# Patient Record
Sex: FEMALE | Race: WHITE | NOT HISPANIC OR LATINO
[De-identification: names, ages, dates, MRNs, and addresses within clinical notes are randomized per-mention and may not be internally consistent; named-entity substitution may affect disease eponyms.]

---

## 2020-12-09 PROBLEM — Z00.00 ENCOUNTER FOR PREVENTIVE HEALTH EXAMINATION: Status: ACTIVE | Noted: 2020-12-09

## 2020-12-23 ENCOUNTER — APPOINTMENT (OUTPATIENT)
Dept: PULMONOLOGY | Facility: CLINIC | Age: 65
End: 2020-12-23
Payer: COMMERCIAL

## 2020-12-23 VITALS — WEIGHT: 155 LBS | HEIGHT: 61 IN | BODY MASS INDEX: 29.27 KG/M2

## 2020-12-23 PROCEDURE — 99213 OFFICE O/P EST LOW 20 MIN: CPT | Mod: 95

## 2020-12-23 RX ORDER — ESCITALOPRAM OXALATE 20 MG/1
20 TABLET, FILM COATED ORAL
Refills: 0 | Status: ACTIVE | COMMUNITY

## 2020-12-23 RX ORDER — RABEPRAZOLE SODIUM 20 MG/1
TABLET, DELAYED RELEASE ORAL
Refills: 0 | Status: ACTIVE | COMMUNITY

## 2020-12-23 RX ORDER — OLMESARTAN MEDOXOMIL 40 MG/1
TABLET, FILM COATED ORAL
Refills: 0 | Status: ACTIVE | COMMUNITY

## 2020-12-23 NOTE — HISTORY OF PRESENT ILLNESS
[FreeTextEntry1] : Dr. Durbin\par 65 year old woman  with history of sleep apnea 15 yrs ago and lost weight and stopped using the cpap is here in the sleep center to address excessive snoring and wtinessed apneas.  Patient is sleepy with Bison sleepiness score of 12 .  Patient has loud snoring , also has witnessed apneas. She has sleep problems for long time has problems falling and staying sleep. Patient's bedtime is around 10-11 PM wakes up in the morning around 8-9 AM. Total sleep time usually is 5 hrs due to disturbed sleep.  She feels tired when she wakes up.  Patient does not drink coffee during the daytime, patient does not have any headaches , has nocturia 2-3 times at night.  She is sleepy while driving.\par STOPBANG score - 4 (moderate risk for apnea)\par

## 2021-01-10 ENCOUNTER — TRANSCRIPTION ENCOUNTER (OUTPATIENT)
Age: 66
End: 2021-01-10

## 2021-04-26 ENCOUNTER — APPOINTMENT (OUTPATIENT)
Dept: PULMONOLOGY | Facility: HOSPITAL | Age: 66
End: 2021-04-26
Payer: COMMERCIAL

## 2021-04-26 VITALS
HEIGHT: 61 IN | WEIGHT: 160 LBS | DIASTOLIC BLOOD PRESSURE: 70 MMHG | HEART RATE: 74 BPM | BODY MASS INDEX: 30.21 KG/M2 | OXYGEN SATURATION: 97 % | SYSTOLIC BLOOD PRESSURE: 120 MMHG

## 2021-04-26 PROCEDURE — 99213 OFFICE O/P EST LOW 20 MIN: CPT

## 2021-04-26 RX ORDER — IPRATROPIUM BROMIDE 42 UG/1
0.06 SPRAY NASAL
Qty: 45 | Refills: 0 | Status: ACTIVE | COMMUNITY
Start: 2021-03-15

## 2021-04-26 RX ORDER — FLUTICASONE PROPIONATE 50 UG/1
50 SPRAY, METERED NASAL
Qty: 48 | Refills: 0 | Status: ACTIVE | COMMUNITY
Start: 2021-03-08

## 2021-04-26 RX ORDER — VALACYCLOVIR 1 G/1
1 TABLET, FILM COATED ORAL
Qty: 12 | Refills: 0 | Status: ACTIVE | COMMUNITY
Start: 2021-02-22

## 2021-04-26 NOTE — PHYSICAL EXAM
[General Appearance - Well Developed] : well developed [General Appearance - Well Nourished] : well nourished [Heart Sounds] : normal S1 and S2 [Murmurs] : no murmurs [] : no respiratory distress [Auscultation Breath Sounds / Voice Sounds] : lungs were clear to auscultation bilaterally [No Focal Deficits] : no focal deficits [Oriented To Time, Place, And Person] : oriented to person, place, and time [Memory Recent] : recent memory was not impaired [FreeTextEntry1] : malampati 4 [FreeTextEntry2] : no edema

## 2021-04-26 NOTE — HISTORY OF PRESENT ILLNESS
[FreeTextEntry1] : Dr. Durbin\par 65 year old woman got her vaccines for covid  with history of sleep apnea 15 yrs ago and lost weight and stopped using the cpap is here in the sleep center to address sleep apnea.  Patient is not sleepy with cpap .  Patients snoring and witnessed apneas improved. Patients insomnia improved with cpap. Patient's bedtime is around 10-11 PM wakes up in the morning around 8-9 AM. \par \par download data shows ahi 3.1\par pressure 11.5 cm\par uses nasal pillows\par dme apria\par on airsense device.\par

## 2021-04-26 NOTE — ASSESSMENT
[FreeTextEntry1] : 65 year  old woman  with sleep apnea is doing well with the CPAP.  Patient is compliant with the CPAP and benefited significantly with the CPAP.\par

## 2021-05-12 ENCOUNTER — NON-APPOINTMENT (OUTPATIENT)
Age: 66
End: 2021-05-12

## 2021-05-21 ENCOUNTER — APPOINTMENT (OUTPATIENT)
Dept: BREAST CENTER | Facility: CLINIC | Age: 66
End: 2021-05-21

## 2021-05-21 DIAGNOSIS — Z86.39 PERSONAL HISTORY OF OTHER ENDOCRINE, NUTRITIONAL AND METABOLIC DISEASE: ICD-10-CM

## 2021-05-21 DIAGNOSIS — M19.90 UNSPECIFIED OSTEOARTHRITIS, UNSPECIFIED SITE: ICD-10-CM

## 2021-05-21 DIAGNOSIS — Z80.1 FAMILY HISTORY OF MALIGNANT NEOPLASM OF TRACHEA, BRONCHUS AND LUNG: ICD-10-CM

## 2021-05-21 DIAGNOSIS — Z80.3 FAMILY HISTORY OF MALIGNANT NEOPLASM OF BREAST: ICD-10-CM

## 2021-05-21 DIAGNOSIS — Z80.7 FAMILY HISTORY OF OTHER MALIGNANT NEOPLASMS OF LYMPHOID, HEMATOPOIETIC AND RELATED TISSUES: ICD-10-CM

## 2021-05-21 DIAGNOSIS — Z87.898 PERSONAL HISTORY OF OTHER SPECIFIED CONDITIONS: ICD-10-CM

## 2021-05-21 DIAGNOSIS — Z86.59 PERSONAL HISTORY OF OTHER MENTAL AND BEHAVIORAL DISORDERS: ICD-10-CM

## 2021-05-21 DIAGNOSIS — Z86.79 PERSONAL HISTORY OF OTHER DISEASES OF THE CIRCULATORY SYSTEM: ICD-10-CM

## 2021-05-24 ENCOUNTER — APPOINTMENT (OUTPATIENT)
Dept: BREAST CENTER | Facility: CLINIC | Age: 66
End: 2021-05-24
Payer: COMMERCIAL

## 2021-05-24 VITALS
HEART RATE: 67 BPM | SYSTOLIC BLOOD PRESSURE: 116 MMHG | OXYGEN SATURATION: 98 % | DIASTOLIC BLOOD PRESSURE: 75 MMHG | HEIGHT: 61 IN | BODY MASS INDEX: 30.21 KG/M2 | WEIGHT: 160 LBS

## 2021-05-24 PROCEDURE — 99072 ADDL SUPL MATRL&STAF TM PHE: CPT

## 2021-05-24 PROCEDURE — 99213 OFFICE O/P EST LOW 20 MIN: CPT

## 2021-05-24 NOTE — REVIEW OF SYSTEMS
[Diarrhea] : diarrhea [Joint Pain] : joint pain [Negative] : Heme/Lymph [FreeTextEntry9] : NECK AND BACK PAIN

## 2021-05-24 NOTE — PHYSICAL EXAM
[Normocephalic] : normocephalic [Atraumatic] : atraumatic [EOMI] : extra ocular movement intact [Supple] : supple [No Supraclavicular Adenopathy] : no supraclavicular adenopathy [No Cervical Adenopathy] : no cervical adenopathy [Examined in the supine and seated position] : examined in the supine and seated position [No dominant masses] : no dominant masses in right breast  [No dominant masses] : no dominant masses left breast [No Nipple Retraction] : no left nipple retraction [No Nipple Discharge] : no left nipple discharge [Breast Mass Right Breast ___cm] : no masses [Breast Mass Left Breast ___cm] : no masses [Breast Nipple Inversion] : nipples not inverted [Breast Nipple Retraction] : nipples not retracted [Breast Nipple Flattening] : nipples not flattened [Breast Nipple Fissures] : nipples not fissured [Breast Abnormal Lactation (Galactorrhea)] : no galactorrhea [Breast Abnormal Secretion Bloody Fluid] : no bloody discharge [Breast Abnormal Secretion Serous Fluid] : no serous discharge [Breast Abnormal Secretion Opalescent Fluid] : no milky discharge [No Axillary Lymphadenopathy] : no left axillary lymphadenopathy [No Edema] : no edema [No Rashes] : no rashes [No Ulceration] : no ulceration [de-identified] : On exam, the patient has full C-cup breasts.  On palpation, she has typical fibroglandular changes but I cannot feel any suspicious densities.  She has no axillary, supraclavicular, or cervical adenopathy.

## 2021-05-24 NOTE — ASSESSMENT
[FreeTextEntry1] : The patient is a 65-year-old G3, P2 postmenopausal white female of [place] descent.  She underwent menarche at age 14 and had her first child at age 30.  She underwent menopause at age 45 and never took any hormone replacement therapy.  She has a strong family history of breast cancer with her mother had breast cancer at age 72 and passed away.  Her sister had breast cancer at age 47.  She has a paternal cousin who had lymphoma at age 25.  The patient's sister tested BRCA negative and had an adenocystic breast cancer.  The patient has a history of fibrocystic mastopathy and has a Wendie model lifetime risk of breast cancer of 38.9%.  She did undergo a left breast biopsy in the 12:00 region in 2007 for a fibroadenoma and had a right breast lymph node biopsy in 2010 which was benign.  On exam, I cannot feel any suspicious densities in either breast today.  She underwent her last bilateral mammography and ultrasound on December 15, 2020 which showed some stable bilateral nodules.  Her last MRI in June 2020 showed no suspicious findings.  She will be due for her next MRI in June 2021 and will obtain her next bilateral mammo and ultrasound in October 2021.  I can see her again in 1 year.

## 2021-05-24 NOTE — REASON FOR VISIT
[Follow-Up: _____] : a [unfilled] follow-up visit [FreeTextEntry1] : The patient comes in with a strong family history of breast cancer and personal history of breast biopsies and fibrocystic mastopathy for continud close surveillance

## 2021-06-23 ENCOUNTER — NON-APPOINTMENT (OUTPATIENT)
Age: 66
End: 2021-06-23

## 2021-08-04 ENCOUNTER — APPOINTMENT (OUTPATIENT)
Dept: PULMONOLOGY | Facility: CLINIC | Age: 66
End: 2021-08-04
Payer: COMMERCIAL

## 2021-08-04 PROCEDURE — 99212 OFFICE O/P EST SF 10 MIN: CPT | Mod: 95

## 2021-08-04 NOTE — HISTORY OF PRESENT ILLNESS
[FreeTextEntry1] : Dr. Durbin\par 65 year old woman got her vaccines for covid  with history of sleep apnea 15 yrs ago and lost weight and stopped using the cpap is here in the sleep center to address sleep apnea.  Patient is not sleepy with cpap .  Patients snoring and witnessed apneas improved. Patients insomnia improved with cpap. Patient's bedtime is around 10-11 PM wakes up in the morning around 8-9 AM. \par \par download data shows ahi 1.9\par pressure 11.5 cm\par uses nasal pillows\par dme apria\par on airsense device.\par \par patient injured with a fall and stopped using cpap for 4 days, she felt ok. her question is if she can stop using the cpap.\par I looked at the download and asked her to continue cpap as she is still using 12 cm of pressure.\par

## 2021-08-04 NOTE — ASSESSMENT
[FreeTextEntry1] : 65 year  old woman  with sleep apnea is doing well with the CPAP.  Patient is compliant with the CPAP and benefited significantly with the CPAP.\par I asked patient to continue using the cpap.

## 2021-08-04 NOTE — REASON FOR VISIT
[Home] : at home, [unfilled] , at the time of the visit. [Medical Office: ([facility])___] : at the medical office located in  [Verbal consent obtained from patient] : the patient, [unfilled] [Follow-Up] : a follow-up visit [Sleep Apnea] : sleep apnea

## 2022-04-15 ENCOUNTER — APPOINTMENT (OUTPATIENT)
Dept: BREAST CENTER | Facility: CLINIC | Age: 67
End: 2022-04-15
Payer: COMMERCIAL

## 2022-04-15 VITALS
WEIGHT: 160 LBS | HEART RATE: 86 BPM | BODY MASS INDEX: 30.21 KG/M2 | HEIGHT: 61 IN | DIASTOLIC BLOOD PRESSURE: 74 MMHG | SYSTOLIC BLOOD PRESSURE: 134 MMHG

## 2022-04-15 DIAGNOSIS — Z91.89 OTHER SPECIFIED PERSONAL RISK FACTORS, NOT ELSEWHERE CLASSIFIED: ICD-10-CM

## 2022-04-15 PROCEDURE — 99213 OFFICE O/P EST LOW 20 MIN: CPT

## 2022-04-15 RX ORDER — SIMVASTATIN 10 MG/1
10 TABLET, FILM COATED ORAL
Refills: 0 | Status: DISCONTINUED | COMMUNITY
End: 2022-04-15

## 2022-04-15 NOTE — PHYSICAL EXAM
[Normocephalic] : normocephalic [Atraumatic] : atraumatic [EOMI] : extra ocular movement intact [Supple] : supple [No Supraclavicular Adenopathy] : no supraclavicular adenopathy [No Cervical Adenopathy] : no cervical adenopathy [Examined in the supine and seated position] : examined in the supine and seated position [No dominant masses] : no dominant masses in right breast  [No dominant masses] : no dominant masses left breast [No Nipple Retraction] : no left nipple retraction [No Nipple Discharge] : no left nipple discharge [Breast Mass Right Breast ___cm] : no masses [Breast Mass Left Breast ___cm] : no masses [No Axillary Lymphadenopathy] : no left axillary lymphadenopathy [No Edema] : no edema [No Rashes] : no rashes [No Ulceration] : no ulceration [Breast Nipple Inversion] : nipples not inverted [Breast Nipple Retraction] : nipples not retracted [Breast Nipple Flattening] : nipples not flattened [Breast Nipple Fissures] : nipples not fissured [Breast Abnormal Lactation (Galactorrhea)] : no galactorrhea [Breast Abnormal Secretion Bloody Fluid] : no bloody discharge [Breast Abnormal Secretion Serous Fluid] : no serous discharge [Breast Abnormal Secretion Opalescent Fluid] : no milky discharge [de-identified] : On exam, the patient has full C-cup breasts.  On palpation, she has typical fibroglandular changes but I cannot feel any suspicious densities.  She has no axillary, supraclavicular, or cervical adenopathy.

## 2022-04-15 NOTE — HISTORY OF PRESENT ILLNESS
[FreeTextEntry1] :   The patient is a 65-year-old G3, P2 postmenopausal white female of [place] descent.  She underwent menarche at age 14 and had her first child at age 30.  She underwent menopause at age 45 and never took any hormone replacement therapy.  She has a strong family history of breast cancer with her mother had breast cancer at age 72 and passed away.  Her sister had breast cancer at age 47.  She has a paternal cousin who had lymphoma at age 25.  The patient's sister tested BRCA negative and had an adenocystic breast cancer.  The patient has a history of fibrocystic mastopathy and has a Wendie model lifetime risk of breast cancer of 38.9%.  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   She did undergo a left breast biopsy in the 12:00 region in 2007 for a fibroadenoma and had a right breast lymph node biopsy in 2010 which was benign.  She comes in for routine yearly clinical exams and gets yearly mammography, ultrasound, and MRIs.

## 2022-04-15 NOTE — ASSESSMENT
[FreeTextEntry1] : The patient is a 66-year-old G3, P2 postmenopausal white female of [place] descent.  She underwent menarche at age 14 and had her first child at age 30.  She underwent menopause at age 45 and never took any hormone replacement therapy.  She has a strong family history of breast cancer with her mother had breast cancer at age 72 and passed away.  Her sister had breast cancer at age 47.  She has a paternal cousin who had lymphoma at age 25.  The patient's sister tested BRCA negative and had an adenocystic breast cancer.  The patient has a history of fibrocystic mastopathy and has a Wendie model lifetime risk of breast cancer of 38.9%.  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  She did undergo a left breast biopsy in the 12:00 region in 2007 for a fibroadenoma and had a right breast lymph node biopsy in 2010 which was benign.  On exam, I cannot feel any suspicious densities in either breast today.  She underwent her last bilateral mammography and ultrasound which was reviewed from October 22, 2021 and performed at [facility] which showed some stable bilateral nodules.  Her last MRI was reviewed from [facility] and performed on June 18, 2021 which showed no suspicious findings.  She will be due for her next MRI in June 2022 but will obtain it now since she recently changed insurance.  She will obtain her next bilateral mammo and ultrasound in October 2022 and she was given prescriptions.  I can see her again in 1 year.

## 2022-04-27 ENCOUNTER — APPOINTMENT (OUTPATIENT)
Dept: PULMONOLOGY | Facility: CLINIC | Age: 67
End: 2022-04-27

## 2022-05-05 ENCOUNTER — NON-APPOINTMENT (OUTPATIENT)
Age: 67
End: 2022-05-05

## 2022-06-02 ENCOUNTER — APPOINTMENT (OUTPATIENT)
Dept: PULMONOLOGY | Facility: CLINIC | Age: 67
End: 2022-06-02
Payer: COMMERCIAL

## 2022-06-02 VITALS
DIASTOLIC BLOOD PRESSURE: 70 MMHG | HEART RATE: 77 BPM | SYSTOLIC BLOOD PRESSURE: 120 MMHG | WEIGHT: 152 LBS | HEIGHT: 61 IN | BODY MASS INDEX: 28.7 KG/M2

## 2022-06-02 DIAGNOSIS — G47.33 OBSTRUCTIVE SLEEP APNEA (ADULT) (PEDIATRIC): ICD-10-CM

## 2022-06-02 PROCEDURE — 99213 OFFICE O/P EST LOW 20 MIN: CPT

## 2022-06-02 RX ORDER — SIMVASTATIN 40 MG/1
TABLET, FILM COATED ORAL
Refills: 0 | Status: ACTIVE | COMMUNITY

## 2022-06-02 RX ORDER — BUPROPION HYDROCHLORIDE 150 MG/1
150 TABLET, EXTENDED RELEASE ORAL
Qty: 90 | Refills: 0 | Status: ACTIVE | COMMUNITY
Start: 2022-01-28

## 2022-06-02 NOTE — HISTORY OF PRESENT ILLNESS
[FreeTextEntry1] : Dr. Durbin\par 66 year old woman got her vaccines for covid  with history of sleep apnea 15 yrs ago.  Patient is not sleepy with cpap .  Patients snoring and witnessed apneas improved. Patients insomnia improved with cpap. Patient's bedtime is around 10-11 PM wakes up in the morning around 8-9 AM. \par \par download data shows ahi 2\par pressure 12 cm\par uses nasal pillows\par dme apria\par on airsense device.\par \par She has lost weight and is not comfortable using the cpap. She barely keeps it on for 4 hrs with difficulty.\par \par

## 2022-06-02 NOTE — ASSESSMENT
[FreeTextEntry1] : 66 year  old woman  with sleep apnea is doing well with the CPAP.  Patient is compliant with the CPAP and benefited significantly with the CPAP.\par I asked patient to continue using the cpap.\par \par she is barely able to keep the cpap for 4 hrs with difficulty.\par \par Will do an in lab study to get an accurate ahi and evaluate for inspire.

## 2022-12-05 ENCOUNTER — RESULT REVIEW (OUTPATIENT)
Age: 67
End: 2022-12-05

## 2023-04-06 ENCOUNTER — APPOINTMENT (OUTPATIENT)
Dept: BREAST CENTER | Facility: CLINIC | Age: 68
End: 2023-04-06
Payer: MEDICARE

## 2023-04-06 PROCEDURE — 99213 OFFICE O/P EST LOW 20 MIN: CPT

## 2023-04-06 NOTE — PHYSICAL EXAM
[Normocephalic] : normocephalic [Atraumatic] : atraumatic [EOMI] : extra ocular movement intact [Supple] : supple [No Supraclavicular Adenopathy] : no supraclavicular adenopathy [No Cervical Adenopathy] : no cervical adenopathy [Examined in the supine and seated position] : examined in the supine and seated position [No dominant masses] : no dominant masses in right breast  [No dominant masses] : no dominant masses left breast [No Nipple Retraction] : no left nipple retraction [No Nipple Discharge] : no left nipple discharge [Breast Mass Right Breast ___cm] : no masses [Breast Mass Left Breast ___cm] : no masses [No Axillary Lymphadenopathy] : no left axillary lymphadenopathy [No Edema] : no edema [No Rashes] : no rashes [No Ulceration] : no ulceration [Breast Nipple Inversion] : nipples not inverted [Breast Nipple Retraction] : nipples not retracted [Breast Nipple Flattening] : nipples not flattened [Breast Nipple Fissures] : nipples not fissured [Breast Abnormal Lactation (Galactorrhea)] : no galactorrhea [Breast Abnormal Secretion Bloody Fluid] : no bloody discharge [Breast Abnormal Secretion Serous Fluid] : no serous discharge [Breast Abnormal Secretion Opalescent Fluid] : no milky discharge [de-identified] : On exam, the patient has full C-cup breasts.  On palpation, she has typical fibroglandular changes but I cannot feel any suspicious densities.  She has no axillary, supraclavicular, or cervical adenopathy.

## 2023-04-06 NOTE — HISTORY OF PRESENT ILLNESS
[FreeTextEntry1] :   The patient is a 67-year-old G3, P2 postmenopausal white female of [place] descent.  She underwent menarche at age 14 and had her first child at age 30.  She underwent menopause at age 45 and never took any hormone replacement therapy.  She has a strong family history of breast cancer with her mother had breast cancer at age 72 and passed away.  Her sister had breast cancer at age 47.  She has a paternal cousin who had lymphoma at age 25.  The patient's sister tested BRCA negative and had an adenocystic breast cancer.  The patient has a history of fibrocystic mastopathy and has a Wendie model lifetime risk of breast cancer of 38.9%.  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   She did undergo a left breast biopsy in the 12:00 region in 2007 for a fibroadenoma and had a right breast lymph node biopsy in 2010 which was benign.  She comes in for routine yearly clinical exams and gets yearly mammography, ultrasound, and MRIs.

## 2023-04-06 NOTE — ASSESSMENT
[FreeTextEntry1] : The patient is a 67-year-old G3, P2 postmenopausal white female of [place] descent.  She underwent menarche at age 14 and had her first child at age 30.  She underwent menopause at age 45 and never took any hormone replacement therapy.  She has a strong family history of breast cancer with her mother had breast cancer at age 72 and passed away.  Her sister had breast cancer at age 47.  She has a paternal cousin who had lymphoma at age 25.  The patient's sister tested BRCA negative and had an adenocystic breast cancer.  The patient has a history of fibrocystic mastopathy and has a Wendie model lifetime risk of breast cancer of 38.9%.  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  She did undergo a left breast biopsy in the 12:00 region in 2007 for a fibroadenoma and had a right breast lymph node biopsy in 2010 which was benign.  On exam, I cannot feel any suspicious densities in either breast today.  She underwent her last bilateral mammography and ultrasound which was reviewed from October 5, 2022 and performed at [facility] which showed some stable bilateral nodules.  Her last MRI was reviewed from [facility] and performed on May 5, 2022 which showed no suspicious findings.  She will be due for her next MRI in May 2023 and she was given a prescription.  She will obtain her next bilateral mammography and ultrasound in October 2023 and she was given prescriptions.  I can see her again in 1 year.

## 2023-05-16 ENCOUNTER — NON-APPOINTMENT (OUTPATIENT)
Age: 68
End: 2023-05-16

## 2023-12-13 ENCOUNTER — RESULT REVIEW (OUTPATIENT)
Age: 68
End: 2023-12-13

## 2024-05-15 ENCOUNTER — NON-APPOINTMENT (OUTPATIENT)
Age: 69
End: 2024-05-15

## 2024-05-31 NOTE — REASON FOR VISIT
Patient last seen by Dr. Kwong on 08/27/2020.  Scheduled to see Dr. Kwong on 08/26/2021 for 1 year follow up.     Patient's clindamycin 300 MG TAB PO once was reordered (2 tablets, 3 refills). Rx refill authorization e-prescribed to patient's pharmacy.     [Follow-Up: _____] : a [unfilled] follow-up visit [FreeTextEntry1] : The patient comes in with a strong family history of breast cancer and personal history of breast biopsies and fibrocystic mastopathy for continud close surveillance

## 2024-05-31 NOTE — ASSESSMENT
[FreeTextEntry1] : The patient is a 68-year-old G3, P2 postmenopausal white female of [place] descent.  She underwent menarche at age 14 and had her first child at age 30.  She underwent menopause at age 45 and never took any hormone replacement therapy.  She has a strong family history of breast cancer with her mother had breast cancer at age 72 and passed away.  Her sister had breast cancer at age 47.  She has a paternal cousin who had lymphoma at age 25.  The patient's sister tested BRCA negative and had an adenocystic breast cancer.  The patient has a history of fibrocystic mastopathy and has a Wendie model lifetime risk of breast cancer of 38.9%.  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  She did undergo a left breast biopsy in the 12:00 region in 2007 for a fibroadenoma and had a right breast lymph node biopsy in 2010 which was benign.  On exam, I cannot feel any suspicious densities in either breast today.  She underwent her last bilateral mammography and ultrasound which was reviewed from December 14, 2023 and performed at [facility] which showed some stable bilateral nodules.  Her last MRI was reviewed from [facility] and performed on May 9, 2023 which showed no suspicious findings.  She will be due for her next MRI in ??????? May 2023 and she was given a prescription.  She will obtain her next bilateral mammography and ultrasound in December 2024 and she was given prescriptions.  I can see her again on a yearly basis.

## 2024-05-31 NOTE — PHYSICAL EXAM
[Normocephalic] : normocephalic [Atraumatic] : atraumatic [EOMI] : extra ocular movement intact [Supple] : supple [No Supraclavicular Adenopathy] : no supraclavicular adenopathy [No Cervical Adenopathy] : no cervical adenopathy [Examined in the supine and seated position] : examined in the supine and seated position [No dominant masses] : no dominant masses in right breast  [No dominant masses] : no dominant masses left breast [No Nipple Retraction] : no left nipple retraction [No Nipple Discharge] : no left nipple discharge [Breast Mass Right Breast ___cm] : no masses [Breast Mass Left Breast ___cm] : no masses [No Axillary Lymphadenopathy] : no left axillary lymphadenopathy [No Edema] : no edema [No Rashes] : no rashes [No Ulceration] : no ulceration [Breast Nipple Inversion] : nipples not inverted [Breast Nipple Retraction] : nipples not retracted [Breast Nipple Flattening] : nipples not flattened [Breast Nipple Fissures] : nipples not fissured [Breast Abnormal Lactation (Galactorrhea)] : no galactorrhea [Breast Abnormal Secretion Bloody Fluid] : no bloody discharge [Breast Abnormal Secretion Serous Fluid] : no serous discharge [Breast Abnormal Secretion Opalescent Fluid] : no milky discharge [de-identified] : On exam, the patient has full C-cup breasts.  On palpation, she has typical fibroglandular changes but I cannot feel any suspicious densities.  She has no axillary, supraclavicular, or cervical adenopathy.

## 2024-05-31 NOTE — HISTORY OF PRESENT ILLNESS
[FreeTextEntry1] :   The patient is a 68-year-old G3, P2 postmenopausal white female of [place] descent.  She underwent menarche at age 14 and had her first child at age 30.  She underwent menopause at age 45 and never took any hormone replacement therapy.  She has a strong family history of breast cancer with her mother had breast cancer at age 72 and passed away.  Her sister had breast cancer at age 47.  She has a paternal cousin who had lymphoma at age 25.  The patient's sister tested BRCA negative and had an adenocystic breast cancer.  The patient has a history of fibrocystic mastopathy and has a Wendie model lifetime risk of breast cancer of 38.9%.  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   She did undergo a left breast biopsy in the 12:00 region in 2007 for a fibroadenoma and had a right breast lymph node biopsy in 2010 which was benign.  She comes in for routine yearly clinical exams and gets yearly mammography, ultrasound, and MRIs.

## 2024-06-06 ENCOUNTER — APPOINTMENT (OUTPATIENT)
Dept: BREAST CENTER | Facility: CLINIC | Age: 69
End: 2024-06-06
Payer: MEDICARE

## 2024-06-06 VITALS
BODY MASS INDEX: 30.21 KG/M2 | HEART RATE: 78 BPM | HEIGHT: 61 IN | OXYGEN SATURATION: 97 % | WEIGHT: 160 LBS | SYSTOLIC BLOOD PRESSURE: 115 MMHG | DIASTOLIC BLOOD PRESSURE: 74 MMHG

## 2024-06-06 DIAGNOSIS — N60.11 DIFFUSE CYSTIC MASTOPATHY OF RIGHT BREAST: ICD-10-CM

## 2024-06-06 DIAGNOSIS — N60.12 DIFFUSE CYSTIC MASTOPATHY OF LEFT BREAST: ICD-10-CM

## 2024-06-06 DIAGNOSIS — Z80.3 FAMILY HISTORY OF MALIGNANT NEOPLASM OF BREAST: ICD-10-CM

## 2024-06-06 DIAGNOSIS — R92.30 DENSE BREASTS, UNSPECIFIED: ICD-10-CM

## 2024-06-06 DIAGNOSIS — Z12.39 ENCOUNTER FOR OTHER SCREENING FOR MALIGNANT NEOPLASM OF BREAST: ICD-10-CM

## 2024-06-06 PROCEDURE — 99212 OFFICE O/P EST SF 10 MIN: CPT

## 2024-06-06 PROCEDURE — G2211 COMPLEX E/M VISIT ADD ON: CPT

## 2024-06-06 NOTE — PHYSICAL EXAM
[Normocephalic] : normocephalic [Atraumatic] : atraumatic [EOMI] : extra ocular movement intact [Supple] : supple [No Supraclavicular Adenopathy] : no supraclavicular adenopathy [No Cervical Adenopathy] : no cervical adenopathy [Examined in the supine and seated position] : examined in the supine and seated position [No dominant masses] : no dominant masses in right breast  [No dominant masses] : no dominant masses left breast [No Nipple Retraction] : no left nipple retraction [No Nipple Discharge] : no left nipple discharge [Breast Mass Right Breast ___cm] : no masses [Breast Mass Left Breast ___cm] : no masses [No Axillary Lymphadenopathy] : no left axillary lymphadenopathy [No Edema] : no edema [No Rashes] : no rashes [No Ulceration] : no ulceration [Breast Nipple Inversion] : nipples not inverted [Breast Nipple Retraction] : nipples not retracted [Breast Nipple Flattening] : nipples not flattened [Breast Nipple Fissures] : nipples not fissured [Breast Abnormal Lactation (Galactorrhea)] : no galactorrhea [Breast Abnormal Secretion Bloody Fluid] : no bloody discharge [Breast Abnormal Secretion Serous Fluid] : no serous discharge [Breast Abnormal Secretion Opalescent Fluid] : no milky discharge [de-identified] : On exam, the patient has full C-cup breasts.  On palpation, she has typical fibroglandular changes but I cannot feel any suspicious densities.  She has no axillary, supraclavicular, or cervical adenopathy.

## 2024-06-06 NOTE — ASSESSMENT
[FreeTextEntry1] : The patient is a 68-year-old G3, P2 postmenopausal white female of [place] descent.  She underwent menarche at age 14 and had her first child at age 30.  She underwent menopause at age 45 and never took any hormone replacement therapy.  She has a strong family history of breast cancer with her mother had breast cancer at age 72 and passed away.  Her sister had breast cancer at age 47.  She has a paternal cousin who had lymphoma at age 25.  The patient's sister tested BRCA negative and had an adenocystic breast cancer.  The patient has a history of fibrocystic mastopathy and has a Wendie model lifetime risk of breast cancer of 38.9%.  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  She did undergo a left breast biopsy in the 12:00 region in 2007 for a fibroadenoma and had a right breast lymph node biopsy in 2010 which was benign.  On exam, I cannot feel any suspicious densities in either breast today.  She underwent her last bilateral mammography and ultrasound which was reviewed from December 14, 2023 and performed at [facility] which showed some stable bilateral nodules.  Her last MRI was reviewed from [facility] and performed on May 9, 2023 which showed no suspicious findings.  She will be due for her next MRI in now and she was given a prescription.  She will obtain her next bilateral mammography and ultrasound in December 2024 and she was given prescriptions.  I can see her again on a yearly basis.  She did have a recent shoulder surgery and has some difficulty with range of motion and is going to delay her MRI for another month or so.  She was asking for a new primary care physician I gave her the list of the [facility] physicians she could contact for appointments.

## 2024-06-06 NOTE — HISTORY OF PRESENT ILLNESS
[FreeTextEntry1] :   The patient is a 68-year-old G3, P2 postmenopausal white female of [place] descent.  She underwent menarche at age 14 and had her first child at age 30.  She underwent menopause at age 45 and never took any hormone replacement therapy.  She has a strong family history of breast cancer with her mother had breast cancer at age 72 and passed away.  Her sister had breast cancer at age 47.  She has a paternal cousin who had lymphoma at age 25.  The patient's sister tested BRCA negative and had an adenocystic breast cancer.  The patient has a history of fibrocystic mastopathy and has a Wendie model lifetime risk of breast cancer of 38.9%.  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   She did undergo a left breast biopsy in the 12:00 region in 2007 for a fibroadenoma and had a right breast lymph node biopsy in 2010 which was benign.  She comes in for routine yearly clinical exams and gets yearly mammography, ultrasound, and MRIs.

## 2024-09-09 ENCOUNTER — RESULT REVIEW (OUTPATIENT)
Age: 69
End: 2024-09-09

## 2024-09-11 ENCOUNTER — NON-APPOINTMENT (OUTPATIENT)
Age: 69
End: 2024-09-11

## 2025-01-02 ENCOUNTER — APPOINTMENT (OUTPATIENT)
Dept: PULMONOLOGY | Facility: CLINIC | Age: 70
End: 2025-01-02
Payer: MEDICARE

## 2025-01-02 VITALS — WEIGHT: 155 LBS | BODY MASS INDEX: 29.27 KG/M2 | HEIGHT: 61 IN

## 2025-01-02 DIAGNOSIS — G47.33 OBSTRUCTIVE SLEEP APNEA (ADULT) (PEDIATRIC): ICD-10-CM

## 2025-01-02 PROCEDURE — 99203 OFFICE O/P NEW LOW 30 MIN: CPT

## 2025-03-25 ENCOUNTER — RESULT REVIEW (OUTPATIENT)
Age: 70
End: 2025-03-25

## 2025-04-28 ENCOUNTER — NON-APPOINTMENT (OUTPATIENT)
Age: 70
End: 2025-04-28

## 2025-05-27 DIAGNOSIS — Z86.79 PERSONAL HISTORY OF OTHER DISEASES OF THE CIRCULATORY SYSTEM: ICD-10-CM

## 2025-05-28 ENCOUNTER — APPOINTMENT (OUTPATIENT)
Dept: ORTHOPEDIC SURGERY | Facility: CLINIC | Age: 70
End: 2025-05-28

## 2025-05-28 DIAGNOSIS — M70.52 OTHER BURSITIS OF KNEE, LEFT KNEE: ICD-10-CM

## 2025-05-28 DIAGNOSIS — M47.816 SPONDYLOSIS W/OUT MYELOPATHY OR RADICULOPATHY, LUMBAR REGION: ICD-10-CM

## 2025-05-28 DIAGNOSIS — I10 ESSENTIAL (PRIMARY) HYPERTENSION: ICD-10-CM

## 2025-05-30 ENCOUNTER — APPOINTMENT (OUTPATIENT)
Dept: ORTHOPEDIC SURGERY | Facility: CLINIC | Age: 70
End: 2025-05-30

## 2025-06-02 ENCOUNTER — NON-APPOINTMENT (OUTPATIENT)
Age: 70
End: 2025-06-02

## 2025-06-03 ENCOUNTER — APPOINTMENT (OUTPATIENT)
Dept: RHEUMATOLOGY | Facility: CLINIC | Age: 70
End: 2025-06-03

## 2025-06-03 ENCOUNTER — APPOINTMENT (OUTPATIENT)
Dept: ORTHOPEDIC SURGERY | Facility: CLINIC | Age: 70
End: 2025-06-03

## 2025-06-03 VITALS
SYSTOLIC BLOOD PRESSURE: 149 MMHG | WEIGHT: 155 LBS | BODY MASS INDEX: 29.27 KG/M2 | DIASTOLIC BLOOD PRESSURE: 83 MMHG | HEIGHT: 61 IN | OXYGEN SATURATION: 99 % | HEART RATE: 86 BPM

## 2025-06-03 DIAGNOSIS — M48.061 SPINAL STENOSIS, LUMBAR REGION WITHOUT NEUROGENIC CLAUDICATION: ICD-10-CM

## 2025-06-03 DIAGNOSIS — M41.26 OTHER IDIOPATHIC SCOLIOSIS, LUMBAR REGION: ICD-10-CM

## 2025-06-03 DIAGNOSIS — G89.29 PAIN IN RIGHT KNEE: ICD-10-CM

## 2025-06-03 DIAGNOSIS — Z96.651 PRESENCE OF RIGHT ARTIFICIAL KNEE JOINT: ICD-10-CM

## 2025-06-03 DIAGNOSIS — M25.561 PAIN IN RIGHT KNEE: ICD-10-CM

## 2025-06-03 DIAGNOSIS — M70.51 OTHER BURSITIS OF KNEE, RIGHT KNEE: ICD-10-CM

## 2025-06-03 PROCEDURE — 99204 OFFICE O/P NEW MOD 45 MIN: CPT | Mod: 25

## 2025-06-03 PROCEDURE — 20610 DRAIN/INJ JOINT/BURSA W/O US: CPT | Mod: RT

## 2025-06-03 RX ORDER — METHYLPRED ACET/NACL,ISO-OS/PF 80 MG/ML
80 VIAL (ML) INJECTION
Refills: 0 | Status: COMPLETED | OUTPATIENT
Start: 2025-06-03

## 2025-06-03 RX ORDER — DEXAMETHASONE SODIUM PHOSPHATE 4 MG/ML
4 INJECTION, SOLUTION INTRAMUSCULAR; INTRAVENOUS
Qty: 0 | Refills: 0 | Status: COMPLETED | OUTPATIENT
Start: 2025-06-03

## 2025-06-03 RX ADMIN — DEXAMETHASONE SODIUM PHOSPHATE 0 MG/ML: 4 INJECTION, SOLUTION INTRA-ARTICULAR; INTRALESIONAL; INTRAMUSCULAR; INTRAVENOUS; SOFT TISSUE at 00:00

## 2025-06-03 RX ADMIN — METHYLPREDNISOLONE ACETATE 0 MG/ML: 80 INJECTION, SUSPENSION INTRA-ARTICULAR; INTRALESIONAL; INTRAMUSCULAR; SOFT TISSUE at 00:00

## 2025-06-06 ENCOUNTER — NON-APPOINTMENT (OUTPATIENT)
Age: 70
End: 2025-06-06

## 2025-06-09 NOTE — HISTORY OF PRESENT ILLNESS
[FreeTextEntry1] :   The patient is a 65-year-old G3, P2 postmenopausal white female of [place] descent.  She underwent menarche at age 14 and had her first child at age 30.  She underwent menopause at age 45 and never took any hormone replacement therapy.  She has a strong family history of breast cancer with her mother had breast cancer at age 72 and passed away.  Her sister had breast cancer at age 47.  She has a paternal cousin who had lymphoma at age 25.  The patient's sister tested BRCA negative and had an adenocystic breast cancer.  The patient has a history of fibrocystic mastopathy and has a Wendie model lifetime risk of breast cancer of 38.9%.  She did undergo a left breast biopsy in the 12:00 region in 2007 for a fibroadenoma and had a right breast lymph node biopsy in 2010 which was benign.  She comes in for routine yearly clinical exams and gets yearly mammography, ultrasound, and MRIs.
No indicators present

## 2025-06-10 ENCOUNTER — APPOINTMENT (OUTPATIENT)
Dept: BREAST CENTER | Facility: CLINIC | Age: 70
End: 2025-06-10
Payer: MEDICARE

## 2025-06-10 VITALS
OXYGEN SATURATION: 99 % | DIASTOLIC BLOOD PRESSURE: 70 MMHG | HEIGHT: 61 IN | HEART RATE: 84 BPM | WEIGHT: 155 LBS | SYSTOLIC BLOOD PRESSURE: 112 MMHG | BODY MASS INDEX: 29.27 KG/M2

## 2025-06-10 PROCEDURE — 99213 OFFICE O/P EST LOW 20 MIN: CPT

## 2025-06-16 PROBLEM — R53.1 WEAKNESS: Status: ACTIVE | Noted: 2025-06-16

## 2025-06-17 ENCOUNTER — APPOINTMENT (OUTPATIENT)
Dept: ORTHOPEDIC SURGERY | Facility: CLINIC | Age: 70
End: 2025-06-17
Payer: MEDICARE

## 2025-06-17 VITALS
HEIGHT: 61 IN | SYSTOLIC BLOOD PRESSURE: 125 MMHG | OXYGEN SATURATION: 98 % | DIASTOLIC BLOOD PRESSURE: 79 MMHG | WEIGHT: 153 LBS | BODY MASS INDEX: 28.89 KG/M2 | HEART RATE: 88 BPM

## 2025-06-17 PROBLEM — K58.9 IRRITABLE BOWEL SYNDROME: Status: ACTIVE | Noted: 2025-06-17

## 2025-06-17 PROBLEM — M46.1 SACROILIITIS: Status: ACTIVE | Noted: 2025-06-17

## 2025-06-17 PROCEDURE — 99214 OFFICE O/P EST MOD 30 MIN: CPT

## 2025-09-10 ENCOUNTER — RESULT REVIEW (OUTPATIENT)
Age: 70
End: 2025-09-10

## 2025-09-10 ENCOUNTER — NON-APPOINTMENT (OUTPATIENT)
Age: 70
End: 2025-09-10